# Patient Record
Sex: FEMALE | Race: WHITE | NOT HISPANIC OR LATINO | Employment: UNEMPLOYED | URBAN - NONMETROPOLITAN AREA
[De-identification: names, ages, dates, MRNs, and addresses within clinical notes are randomized per-mention and may not be internally consistent; named-entity substitution may affect disease eponyms.]

---

## 2017-07-10 ENCOUNTER — HOSPITAL ENCOUNTER (EMERGENCY)
Facility: HOSPITAL | Age: 57
Discharge: HOME | End: 2017-07-10
Attending: EMERGENCY MEDICINE | Admitting: EMERGENCY MEDICINE

## 2017-07-10 VITALS
RESPIRATION RATE: 18 BRPM | HEIGHT: 64 IN | SYSTOLIC BLOOD PRESSURE: 155 MMHG | OXYGEN SATURATION: 98 % | TEMPERATURE: 98.7 F | DIASTOLIC BLOOD PRESSURE: 78 MMHG | WEIGHT: 190 LBS | HEART RATE: 69 BPM | BODY MASS INDEX: 32.44 KG/M2

## 2017-07-10 DIAGNOSIS — N39.0 ACUTE UTI: Primary | ICD-10-CM

## 2017-07-10 DIAGNOSIS — E11.65 TYPE 2 DIABETES MELLITUS WITH HYPERGLYCEMIA, WITHOUT LONG-TERM CURRENT USE OF INSULIN (HCC): ICD-10-CM

## 2017-07-10 LAB
ALBUMIN SERPL-MCNC: 4 G/DL (ref 3.5–5)
ALBUMIN/GLOB SERPL: 1.5 G/DL (ref 1.5–2.5)
ALP SERPL-CCNC: 47 U/L (ref 35–104)
ALT SERPL W P-5'-P-CCNC: 18 U/L (ref 10–36)
ANION GAP SERPL CALCULATED.3IONS-SCNC: 10.4 MMOL/L (ref 3.6–11.2)
AST SERPL-CCNC: 15 U/L (ref 10–30)
BACTERIA UR QL AUTO: ABNORMAL /HPF
BASOPHILS # BLD AUTO: 0.11 10*3/MM3 (ref 0–0.3)
BASOPHILS NFR BLD AUTO: 1.4 % (ref 0–2)
BILIRUB SERPL-MCNC: 0.5 MG/DL (ref 0.2–1.8)
BILIRUB UR QL STRIP: NEGATIVE
BUN BLD-MCNC: 17 MG/DL (ref 7–21)
BUN/CREAT SERPL: 20.2 (ref 7–25)
CALCIUM SPEC-SCNC: 9.6 MG/DL (ref 7.7–10)
CHLORIDE SERPL-SCNC: 98 MMOL/L (ref 99–112)
CLARITY UR: CLEAR
CO2 SERPL-SCNC: 27.6 MMOL/L (ref 24.3–31.9)
COLOR UR: YELLOW
CREAT BLD-MCNC: 0.84 MG/DL (ref 0.43–1.29)
DEPRECATED RDW RBC AUTO: 41.5 FL (ref 37–54)
EOSINOPHIL # BLD AUTO: 0.18 10*3/MM3 (ref 0–0.7)
EOSINOPHIL NFR BLD AUTO: 2.3 % (ref 0–5)
ERYTHROCYTE [DISTWIDTH] IN BLOOD BY AUTOMATED COUNT: 13.4 % (ref 11.5–14.5)
GFR SERPL CREATININE-BSD FRML MDRD: 70 ML/MIN/1.73
GLOBULIN UR ELPH-MCNC: 2.6 GM/DL
GLUCOSE BLD-MCNC: 266 MG/DL (ref 70–110)
GLUCOSE BLDC GLUCOMTR-MCNC: 275 MG/DL (ref 70–130)
GLUCOSE UR STRIP-MCNC: ABNORMAL MG/DL
HCT VFR BLD AUTO: 39.1 % (ref 37–47)
HGB BLD-MCNC: 13 G/DL (ref 12–16)
HGB UR QL STRIP.AUTO: NEGATIVE
HYALINE CASTS UR QL AUTO: ABNORMAL /LPF
IMM GRANULOCYTES # BLD: 0.03 10*3/MM3 (ref 0–0.03)
IMM GRANULOCYTES NFR BLD: 0.4 % (ref 0–0.5)
KETONES UR QL STRIP: NEGATIVE
LEUKOCYTE ESTERASE UR QL STRIP.AUTO: ABNORMAL
LYMPHOCYTES # BLD AUTO: 3.12 10*3/MM3 (ref 1–3)
LYMPHOCYTES NFR BLD AUTO: 40.4 % (ref 21–51)
MCH RBC QN AUTO: 28.9 PG (ref 27–33)
MCHC RBC AUTO-ENTMCNC: 33.2 G/DL (ref 33–37)
MCV RBC AUTO: 86.9 FL (ref 80–94)
MONOCYTES # BLD AUTO: 0.62 10*3/MM3 (ref 0.1–0.9)
MONOCYTES NFR BLD AUTO: 8 % (ref 0–10)
NEUTROPHILS # BLD AUTO: 3.67 10*3/MM3 (ref 1.4–6.5)
NEUTROPHILS NFR BLD AUTO: 47.5 % (ref 30–70)
NITRITE UR QL STRIP: POSITIVE
OSMOLALITY SERPL CALC.SUM OF ELEC: 282.8 MOSM/KG (ref 273–305)
PH UR STRIP.AUTO: <=5 [PH] (ref 5–8)
PLATELET # BLD AUTO: 176 10*3/MM3 (ref 130–400)
PMV BLD AUTO: 12.7 FL (ref 6–10)
POTASSIUM BLD-SCNC: 3.2 MMOL/L (ref 3.5–5.3)
PROT SERPL-MCNC: 6.6 G/DL (ref 6–8)
PROT UR QL STRIP: ABNORMAL
RBC # BLD AUTO: 4.5 10*6/MM3 (ref 4.2–5.4)
RBC # UR: ABNORMAL /HPF
REF LAB TEST METHOD: ABNORMAL
SODIUM BLD-SCNC: 136 MMOL/L (ref 135–153)
SP GR UR STRIP: 1.01 (ref 1–1.03)
SQUAMOUS #/AREA URNS HPF: ABNORMAL /HPF
UROBILINOGEN UR QL STRIP: ABNORMAL
WBC NRBC COR # BLD: 7.73 10*3/MM3 (ref 4.5–12.5)
WBC UR QL AUTO: ABNORMAL /HPF

## 2017-07-10 PROCEDURE — 87077 CULTURE AEROBIC IDENTIFY: CPT | Performed by: PHYSICIAN ASSISTANT

## 2017-07-10 PROCEDURE — 87186 SC STD MICRODIL/AGAR DIL: CPT | Performed by: PHYSICIAN ASSISTANT

## 2017-07-10 PROCEDURE — 82962 GLUCOSE BLOOD TEST: CPT

## 2017-07-10 PROCEDURE — 99283 EMERGENCY DEPT VISIT LOW MDM: CPT

## 2017-07-10 PROCEDURE — 81001 URINALYSIS AUTO W/SCOPE: CPT | Performed by: PHYSICIAN ASSISTANT

## 2017-07-10 PROCEDURE — 36415 COLL VENOUS BLD VENIPUNCTURE: CPT

## 2017-07-10 PROCEDURE — 87086 URINE CULTURE/COLONY COUNT: CPT | Performed by: PHYSICIAN ASSISTANT

## 2017-07-10 PROCEDURE — 80053 COMPREHEN METABOLIC PANEL: CPT | Performed by: PHYSICIAN ASSISTANT

## 2017-07-10 PROCEDURE — 85025 COMPLETE CBC W/AUTO DIFF WBC: CPT | Performed by: PHYSICIAN ASSISTANT

## 2017-07-10 RX ORDER — LISINOPRIL 40 MG/1
40 TABLET ORAL DAILY
COMMUNITY

## 2017-07-10 RX ORDER — ATORVASTATIN CALCIUM 80 MG/1
80 TABLET, FILM COATED ORAL DAILY
COMMUNITY

## 2017-07-10 RX ORDER — DICLOFENAC SODIUM AND MISOPROSTOL 50; 200 MG/1; UG/1
1 TABLET, DELAYED RELEASE ORAL 2 TIMES DAILY
COMMUNITY
End: 2022-07-19 | Stop reason: SDUPTHER

## 2017-07-10 RX ORDER — FUROSEMIDE 20 MG/1
20 TABLET ORAL 2 TIMES DAILY
COMMUNITY

## 2017-07-10 RX ORDER — NITROFURANTOIN 25; 75 MG/1; MG/1
100 CAPSULE ORAL 2 TIMES DAILY
Qty: 14 CAPSULE | Refills: 0 | OUTPATIENT
Start: 2017-07-10 | End: 2019-05-04

## 2017-07-10 RX ORDER — HYDRALAZINE HYDROCHLORIDE 10 MG/1
10 TABLET, FILM COATED ORAL 3 TIMES DAILY
COMMUNITY

## 2017-07-10 RX ORDER — NITROFURANTOIN 25; 75 MG/1; MG/1
100 CAPSULE ORAL EVERY 12 HOURS SCHEDULED
Status: DISCONTINUED | OUTPATIENT
Start: 2017-07-10 | End: 2017-07-10 | Stop reason: HOSPADM

## 2017-07-10 RX ORDER — METFORMIN HYDROCHLORIDE 500 MG/1
500 TABLET, EXTENDED RELEASE ORAL
COMMUNITY

## 2017-07-10 RX ORDER — ARIPIPRAZOLE 10 MG/1
10 TABLET ORAL DAILY
COMMUNITY

## 2017-07-10 RX ORDER — OXYBUTYNIN CHLORIDE 5 MG/1
5 TABLET ORAL 3 TIMES DAILY
COMMUNITY

## 2017-07-10 RX ORDER — CETIRIZINE HYDROCHLORIDE 10 MG/1
10 TABLET ORAL DAILY
COMMUNITY

## 2017-07-10 RX ORDER — DULOXETIN HYDROCHLORIDE 60 MG/1
60 CAPSULE, DELAYED RELEASE ORAL DAILY
COMMUNITY

## 2017-07-10 RX ORDER — ASPIRIN 81 MG/1
81 TABLET, CHEWABLE ORAL DAILY
COMMUNITY

## 2017-07-10 RX ORDER — ATENOLOL AND CHLORTHALIDONE TABLET 100; 25 MG/1; MG/1
TABLET ORAL DAILY
COMMUNITY

## 2017-07-10 RX ORDER — AMLODIPINE BESYLATE 10 MG/1
10 TABLET ORAL DAILY
COMMUNITY

## 2017-07-10 RX ORDER — GABAPENTIN 100 MG/1
100 CAPSULE ORAL 3 TIMES DAILY
COMMUNITY

## 2017-07-10 RX ADMIN — NITROFURANTOIN MONOHYDRATE/MACROCRYSTALLINE 100 MG: 25; 75 CAPSULE ORAL at 20:28

## 2017-07-11 NOTE — ED PROVIDER NOTES
Subjective   HPI Comments: 57 yo WF, presented to ER w/ c/o elevated blood sugar.  Pt admitted that blood sugar has increased over the last couple of days.  Highest has been 500.  Pt admitted to no changes with her diet.  She is currently on 100mg of januvia, and 500 mg of metformin (bid).  Pt admitted to feeling confused, dizzy.        Review of Systems   Constitutional: Negative.  Negative for fever.   HENT: Negative.    Respiratory: Negative.    Cardiovascular: Negative.  Negative for chest pain.   Gastrointestinal: Negative.  Negative for abdominal pain.   Endocrine: Negative.    Genitourinary: Negative.  Negative for dysuria.   Skin: Negative.    Neurological: Negative.    Psychiatric/Behavioral: Negative.    All other systems reviewed and are negative.      Past Medical History:   Diagnosis Date   • Coronary artery disease    • Depression    • Diabetes mellitus    • GERD (gastroesophageal reflux disease)    • Hyperlipidemia    • Hypertension        Allergies   Allergen Reactions   • Latex        History reviewed. No pertinent surgical history.    History reviewed. No pertinent family history.    Social History     Social History   • Marital status:      Spouse name: N/A   • Number of children: N/A   • Years of education: N/A     Social History Main Topics   • Smoking status: Former Smoker   • Smokeless tobacco: None   • Alcohol use No   • Drug use: No   • Sexual activity: Defer     Other Topics Concern   • None     Social History Narrative   • None           Objective   Physical Exam   Constitutional: She is oriented to person, place, and time. She appears well-developed and well-nourished. No distress.   HENT:   Head: Normocephalic and atraumatic.   Nose: Nose normal.   Eyes: Conjunctivae and EOM are normal. Pupils are equal, round, and reactive to light.   Neck: Normal range of motion. Neck supple. No JVD present. No tracheal deviation present.   Cardiovascular: Normal rate, regular rhythm and normal  heart sounds.    No murmur heard.  Pulmonary/Chest: Effort normal and breath sounds normal. No respiratory distress. She has no wheezes.   Abdominal: Soft. Bowel sounds are normal. There is no tenderness.   Musculoskeletal: Normal range of motion. She exhibits no edema or deformity.   Neurological: She is alert and oriented to person, place, and time. No cranial nerve deficit.   Skin: Skin is warm and dry. No rash noted. She is not diaphoretic. No erythema. No pallor.   Psychiatric: She has a normal mood and affect. Her behavior is normal. Thought content normal.   Nursing note and vitals reviewed.      Procedures         ED Course  ED Course                  MDM  Number of Diagnoses or Management Options  Acute UTI: new and requires workup  Type 2 diabetes mellitus with hyperglycemia, without long-term current use of insulin: new and requires workup     Amount and/or Complexity of Data Reviewed  Clinical lab tests: ordered and reviewed    Risk of Complications, Morbidity, and/or Mortality  Presenting problems: low  Diagnostic procedures: low  Management options: low    Patient Progress  Patient progress: stable      Final diagnoses:   Acute UTI   Type 2 diabetes mellitus with hyperglycemia, without long-term current use of insulin            RENATE Dickerson  07/10/17 6318

## 2017-07-11 NOTE — ED NOTES
Patient reports elevated glucose readings today. Has been feeling very poorly for a couple of days. Complains of dizziness and fatigue. States she is staggering when she walks.  at bedside.     Jesika Leija RN  07/10/17 2031

## 2017-07-13 LAB — BACTERIA SPEC AEROBE CULT: ABNORMAL

## 2017-07-16 ENCOUNTER — HOSPITAL ENCOUNTER (EMERGENCY)
Facility: HOSPITAL | Age: 57
Discharge: HOME | End: 2017-07-16
Attending: EMERGENCY MEDICINE | Admitting: EMERGENCY MEDICINE

## 2017-07-16 VITALS
TEMPERATURE: 98.1 F | WEIGHT: 195 LBS | RESPIRATION RATE: 18 BRPM | HEIGHT: 64 IN | OXYGEN SATURATION: 100 % | SYSTOLIC BLOOD PRESSURE: 118 MMHG | HEART RATE: 70 BPM | BODY MASS INDEX: 33.29 KG/M2 | DIASTOLIC BLOOD PRESSURE: 72 MMHG

## 2017-07-16 DIAGNOSIS — R19.7 DIARRHEA, UNSPECIFIED TYPE: Primary | ICD-10-CM

## 2017-07-16 LAB
027 TOXIN: (no result)
ALBUMIN SERPL-MCNC: 4.2 G/DL (ref 3.5–5)
ALBUMIN/GLOB SERPL: 1.4 G/DL (ref 1.5–2.5)
ALP SERPL-CCNC: 51 U/L (ref 35–104)
ALT SERPL W P-5'-P-CCNC: 21 U/L (ref 10–36)
AMYLASE SERPL-CCNC: 20 U/L (ref 28–100)
ANION GAP SERPL CALCULATED.3IONS-SCNC: 9.2 MMOL/L (ref 3.6–11.2)
AST SERPL-CCNC: 24 U/L (ref 10–30)
BASOPHILS # BLD AUTO: 0.05 10*3/MM3 (ref 0–0.3)
BASOPHILS NFR BLD AUTO: 0.5 % (ref 0–2)
BILIRUB SERPL-MCNC: 0.5 MG/DL (ref 0.2–1.8)
BUN BLD-MCNC: 28 MG/DL (ref 7–21)
BUN/CREAT SERPL: 35 (ref 7–25)
C DIFF TOX GENS STL QL NAA+PROBE: NEGATIVE
CALCIUM SPEC-SCNC: 9.2 MG/DL (ref 7.7–10)
CHLORIDE SERPL-SCNC: 101 MMOL/L (ref 99–112)
CO2 SERPL-SCNC: 25.8 MMOL/L (ref 24.3–31.9)
CREAT BLD-MCNC: 0.8 MG/DL (ref 0.43–1.29)
DEPRECATED RDW RBC AUTO: 42.9 FL (ref 37–54)
EOSINOPHIL # BLD AUTO: 0.24 10*3/MM3 (ref 0–0.7)
EOSINOPHIL NFR BLD AUTO: 2.5 % (ref 0–5)
ERYTHROCYTE [DISTWIDTH] IN BLOOD BY AUTOMATED COUNT: 13.5 % (ref 11.5–14.5)
GFR SERPL CREATININE-BSD FRML MDRD: 74 ML/MIN/1.73
GLOBULIN UR ELPH-MCNC: 3.1 GM/DL
GLUCOSE BLD-MCNC: 155 MG/DL (ref 70–110)
HCT VFR BLD AUTO: 41 % (ref 37–47)
HGB BLD-MCNC: 13.2 G/DL (ref 12–16)
HOLD SPECIMEN: NORMAL
HOLD SPECIMEN: NORMAL
IMM GRANULOCYTES # BLD: 0.01 10*3/MM3 (ref 0–0.03)
IMM GRANULOCYTES NFR BLD: 0.1 % (ref 0–0.5)
LIPASE SERPL-CCNC: 26 U/L (ref 13–60)
LYMPHOCYTES # BLD AUTO: 3.16 10*3/MM3 (ref 1–3)
LYMPHOCYTES NFR BLD AUTO: 32.3 % (ref 21–51)
MCH RBC QN AUTO: 28.4 PG (ref 27–33)
MCHC RBC AUTO-ENTMCNC: 32.2 G/DL (ref 33–37)
MCV RBC AUTO: 88.4 FL (ref 80–94)
MONOCYTES # BLD AUTO: 0.83 10*3/MM3 (ref 0.1–0.9)
MONOCYTES NFR BLD AUTO: 8.5 % (ref 0–10)
NEUTROPHILS # BLD AUTO: 5.5 10*3/MM3 (ref 1.4–6.5)
NEUTROPHILS NFR BLD AUTO: 56.1 % (ref 30–70)
OSMOLALITY SERPL CALC.SUM OF ELEC: 280.6 MOSM/KG (ref 273–305)
PLATELET # BLD AUTO: 175 10*3/MM3 (ref 130–400)
PMV BLD AUTO: 13.4 FL (ref 6–10)
POTASSIUM BLD-SCNC: 3.4 MMOL/L (ref 3.5–5.3)
PROT SERPL-MCNC: 7.3 G/DL (ref 6–8)
RBC # BLD AUTO: 4.64 10*6/MM3 (ref 4.2–5.4)
RV AG STL QL IA: NEGATIVE
SODIUM BLD-SCNC: 136 MMOL/L (ref 135–153)
WBC NRBC COR # BLD: 9.79 10*3/MM3 (ref 4.5–12.5)
WHOLE BLOOD HOLD SPECIMEN: NORMAL
WHOLE BLOOD HOLD SPECIMEN: NORMAL

## 2017-07-16 PROCEDURE — 80053 COMPREHEN METABOLIC PANEL: CPT | Performed by: PHYSICIAN ASSISTANT

## 2017-07-16 PROCEDURE — 82150 ASSAY OF AMYLASE: CPT | Performed by: PHYSICIAN ASSISTANT

## 2017-07-16 PROCEDURE — 87798 DETECT AGENT NOS DNA AMP: CPT | Performed by: PHYSICIAN ASSISTANT

## 2017-07-16 PROCEDURE — 96361 HYDRATE IV INFUSION ADD-ON: CPT

## 2017-07-16 PROCEDURE — 25010000002 DICYCLOMINE PER 20 MG: Performed by: PHYSICIAN ASSISTANT

## 2017-07-16 PROCEDURE — 87425 ROTAVIRUS AG IA: CPT | Performed by: PHYSICIAN ASSISTANT

## 2017-07-16 PROCEDURE — 83690 ASSAY OF LIPASE: CPT | Performed by: PHYSICIAN ASSISTANT

## 2017-07-16 PROCEDURE — 99284 EMERGENCY DEPT VISIT MOD MDM: CPT

## 2017-07-16 PROCEDURE — 87493 C DIFF AMPLIFIED PROBE: CPT | Performed by: PHYSICIAN ASSISTANT

## 2017-07-16 PROCEDURE — 96372 THER/PROPH/DIAG INJ SC/IM: CPT

## 2017-07-16 PROCEDURE — 85025 COMPLETE CBC W/AUTO DIFF WBC: CPT | Performed by: PHYSICIAN ASSISTANT

## 2017-07-16 PROCEDURE — 96365 THER/PROPH/DIAG IV INF INIT: CPT

## 2017-07-16 RX ORDER — DICYCLOMINE HCL 20 MG
20 TABLET ORAL EVERY 8 HOURS PRN
Qty: 30 TABLET | Refills: 0 | Status: SHIPPED | OUTPATIENT
Start: 2017-07-16

## 2017-07-16 RX ORDER — POTASSIUM CHLORIDE 750 MG/1
10 TABLET, FILM COATED, EXTENDED RELEASE ORAL DAILY
Qty: 30 TABLET | Refills: 0 | Status: SHIPPED | OUTPATIENT
Start: 2017-07-16

## 2017-07-16 RX ORDER — DICYCLOMINE HYDROCHLORIDE 10 MG/1
20 CAPSULE ORAL ONCE
Status: COMPLETED | OUTPATIENT
Start: 2017-07-16 | End: 2017-07-16

## 2017-07-16 RX ORDER — SODIUM CHLORIDE 0.9 % (FLUSH) 0.9 %
10 SYRINGE (ML) INJECTION AS NEEDED
Status: DISCONTINUED | OUTPATIENT
Start: 2017-07-16 | End: 2017-07-17 | Stop reason: HOSPADM

## 2017-07-16 RX ORDER — DICYCLOMINE HYDROCHLORIDE 10 MG/ML
20 INJECTION INTRAMUSCULAR ONCE
Status: COMPLETED | OUTPATIENT
Start: 2017-07-16 | End: 2017-07-16

## 2017-07-16 RX ADMIN — DICYCLOMINE HYDROCHLORIDE 20 MG: 10 CAPSULE ORAL at 23:15

## 2017-07-16 RX ADMIN — SODIUM CHLORIDE 1000 ML: 9 INJECTION, SOLUTION INTRAVENOUS at 20:30

## 2017-07-16 RX ADMIN — METRONIDAZOLE 500 MG: 500 INJECTION, SOLUTION INTRAVENOUS at 21:44

## 2017-07-16 RX ADMIN — SODIUM CHLORIDE 1000 ML: 9 INJECTION, SOLUTION INTRAVENOUS at 21:21

## 2017-07-16 RX ADMIN — DICYCLOMINE HYDROCHLORIDE 20 MG: 20 INJECTION, SOLUTION INTRAMUSCULAR at 21:22

## 2017-07-17 NOTE — ED PROVIDER NOTES
Subjective   Patient is a 56 y.o. female presenting with diarrhea.   History provided by:  Patient  Diarrhea   The primary symptoms include diarrhea. Primary symptoms do not include fever, weight loss, fatigue, abdominal pain or dysuria. The illness began 3 to 5 days ago. The onset was sudden. The problem has been gradually worsening.   Associated medical issues do not include inflammatory bowel disease, GERD, gallstones, alcohol abuse, PUD, irritable bowel syndrome, hemorrhoids or diverticulitis.       Review of Systems   Constitutional: Negative.  Negative for fatigue, fever and weight loss.   HENT: Negative.    Respiratory: Negative.    Cardiovascular: Negative.  Negative for chest pain.   Gastrointestinal: Positive for diarrhea. Negative for abdominal pain.   Endocrine: Negative.    Genitourinary: Negative.  Negative for dysuria.   Skin: Negative.    Neurological: Negative.    Psychiatric/Behavioral: Negative.    All other systems reviewed and are negative.      Past Medical History:   Diagnosis Date   • Coronary artery disease    • Depression    • Diabetes mellitus    • GERD (gastroesophageal reflux disease)    • Hyperlipidemia    • Hypertension        Allergies   Allergen Reactions   • Latex        History reviewed. No pertinent surgical history.    History reviewed. No pertinent family history.    Social History     Social History   • Marital status:      Spouse name: N/A   • Number of children: N/A   • Years of education: N/A     Social History Main Topics   • Smoking status: Former Smoker   • Smokeless tobacco: None   • Alcohol use No   • Drug use: No   • Sexual activity: Defer     Other Topics Concern   • None     Social History Narrative           Objective   Physical Exam   Constitutional: She is oriented to person, place, and time. She appears well-developed and well-nourished. No distress.   HENT:   Head: Normocephalic and atraumatic.   Nose: Nose normal.   Eyes: Conjunctivae and EOM are normal.  Pupils are equal, round, and reactive to light.   Neck: Normal range of motion. Neck supple. No JVD present. No tracheal deviation present.   Cardiovascular: Normal rate, regular rhythm and normal heart sounds.    No murmur heard.  Pulmonary/Chest: Effort normal and breath sounds normal. No respiratory distress. She has no wheezes.   Abdominal: Soft. Bowel sounds are normal. There is no tenderness.   Hyperactive BS   Musculoskeletal: Normal range of motion. She exhibits no edema or deformity.   Neurological: She is alert and oriented to person, place, and time. No cranial nerve deficit.   Skin: Skin is warm and dry. No rash noted. She is not diaphoretic. No erythema. No pallor.   Psychiatric: She has a normal mood and affect. Her behavior is normal. Thought content normal.   Nursing note and vitals reviewed.      Procedures         ED Course  ED Course                  MDM  Number of Diagnoses or Management Options  Diarrhea, unspecified type: new and requires workup     Amount and/or Complexity of Data Reviewed  Clinical lab tests: ordered and reviewed    Risk of Complications, Morbidity, and/or Mortality  Presenting problems: moderate  Diagnostic procedures: moderate  Management options: low    Patient Progress  Patient progress: stable      Final diagnoses:   Diarrhea, unspecified type            RENATE Dickerson  07/16/17 0170

## 2017-07-17 NOTE — ED NOTES
Patient IV noted to have signs of infiltration at this time after pt returned from ED restroom. A hardened, enlarged area over IV site noted at this time; flagyl and normal saline paused at this time. Pharmacy called to question concerns dangers of infiltrating flagyl and spoke with Carroll who states we do not have a certain protocol for flagyl. Lead nurse made aware for further instruction.      Deion Hogue RN  07/16/17 6448

## 2017-07-17 NOTE — ED NOTES
Pressure dressing applied to IV infiltration site as well as warm compresses.      Deion Hogue RN  07/16/17 8033

## 2017-07-19 LAB
NOROVIRUS GI: NEGATIVE
NOROVIRUS GII: NEGATIVE

## 2019-03-14 ENCOUNTER — APPOINTMENT (OUTPATIENT)
Dept: MAMMOGRAPHY | Facility: HOSPITAL | Age: 59
End: 2019-03-14

## 2019-04-05 ENCOUNTER — APPOINTMENT (OUTPATIENT)
Dept: MAMMOGRAPHY | Facility: HOSPITAL | Age: 59
End: 2019-04-05

## 2019-04-17 ENCOUNTER — APPOINTMENT (OUTPATIENT)
Dept: MAMMOGRAPHY | Facility: HOSPITAL | Age: 59
End: 2019-04-17

## 2019-04-25 ENCOUNTER — TRANSCRIBE ORDERS (OUTPATIENT)
Dept: ADMINISTRATIVE | Facility: HOSPITAL | Age: 59
End: 2019-04-25

## 2019-04-26 ENCOUNTER — TRANSCRIBE ORDERS (OUTPATIENT)
Dept: ADMINISTRATIVE | Facility: HOSPITAL | Age: 59
End: 2019-04-26

## 2019-04-26 DIAGNOSIS — E04.1 RIGHT THYROID NODULE: Primary | ICD-10-CM

## 2019-05-03 ENCOUNTER — HOSPITAL ENCOUNTER (OUTPATIENT)
Dept: ULTRASOUND IMAGING | Facility: HOSPITAL | Age: 59
Discharge: HOME | End: 2019-05-03
Admitting: NURSE PRACTITIONER

## 2019-05-03 DIAGNOSIS — E04.1 RIGHT THYROID NODULE: ICD-10-CM

## 2019-05-03 PROCEDURE — 76536 US EXAM OF HEAD AND NECK: CPT

## 2019-05-03 PROCEDURE — 76536 US EXAM OF HEAD AND NECK: CPT | Performed by: RADIOLOGY

## 2022-07-19 ENCOUNTER — APPOINTMENT (OUTPATIENT)
Dept: GENERAL RADIOLOGY | Facility: HOSPITAL | Age: 62
End: 2022-07-19

## 2022-07-19 ENCOUNTER — HOSPITAL ENCOUNTER (EMERGENCY)
Facility: HOSPITAL | Age: 62
Discharge: HOME | End: 2022-07-19
Attending: STUDENT IN AN ORGANIZED HEALTH CARE EDUCATION/TRAINING PROGRAM | Admitting: STUDENT IN AN ORGANIZED HEALTH CARE EDUCATION/TRAINING PROGRAM

## 2022-07-19 VITALS
BODY MASS INDEX: 24.39 KG/M2 | OXYGEN SATURATION: 98 % | HEART RATE: 98 BPM | DIASTOLIC BLOOD PRESSURE: 85 MMHG | HEIGHT: 65 IN | RESPIRATION RATE: 16 BRPM | WEIGHT: 146.4 LBS | TEMPERATURE: 98.2 F | SYSTOLIC BLOOD PRESSURE: 176 MMHG

## 2022-07-19 DIAGNOSIS — M19.041 ARTHRITIS OF HAND, RIGHT: Primary | ICD-10-CM

## 2022-07-19 PROCEDURE — 99283 EMERGENCY DEPT VISIT LOW MDM: CPT

## 2022-07-19 PROCEDURE — 73120 X-RAY EXAM OF HAND: CPT

## 2022-07-19 RX ORDER — CLONIDINE HYDROCHLORIDE 0.1 MG/1
0.1 TABLET ORAL ONCE
Status: COMPLETED | OUTPATIENT
Start: 2022-07-19 | End: 2022-07-19

## 2022-07-19 RX ORDER — DICLOFENAC SODIUM AND MISOPROSTOL 50; 200 MG/1; UG/1
1 TABLET, DELAYED RELEASE ORAL 2 TIMES DAILY
Qty: 60 TABLET | Refills: 3 | Status: SHIPPED | OUTPATIENT
Start: 2022-07-19

## 2022-07-19 RX ORDER — IBUPROFEN 400 MG/1
800 TABLET ORAL ONCE
Status: COMPLETED | OUTPATIENT
Start: 2022-07-19 | End: 2022-07-19

## 2022-07-19 RX ORDER — HYDROCODONE BITARTRATE AND ACETAMINOPHEN 5; 325 MG/1; MG/1
1 TABLET ORAL ONCE
Status: COMPLETED | OUTPATIENT
Start: 2022-07-19 | End: 2022-07-19

## 2022-07-19 RX ADMIN — HYDROCODONE BITARTRATE AND ACETAMINOPHEN 1 TABLET: 5; 325 TABLET ORAL at 22:17

## 2022-07-19 RX ADMIN — CLONIDINE HYDROCHLORIDE 0.1 MG: 0.1 TABLET ORAL at 22:17

## 2022-07-19 RX ADMIN — IBUPROFEN 800 MG: 400 TABLET, FILM COATED ORAL at 22:17

## 2022-07-20 NOTE — ED NOTES
MEDICAL SCREENING:    Reason for Visit: R thumb pain      Patient initially seen in triage.  The patient was advised further evaluation and diagnostic testing will be needed, some of the treatment and testing will be initiated in the lobby in order to begin the process.  The patient will be returned to the waiting area for the time being and possibly be re-assessed by a subsequent ED provider.  The patient will be brought back to the treatment area in as timely manner as possible.         Katelin Rios PA  07/19/22 6749

## 2022-07-20 NOTE — ED PROVIDER NOTES
Subjective     History provided by:  Patient  Hand Pain  Location:  Right hand / thumb  Quality:  Pain  Severity:  Moderate  Onset quality:  Gradual  Timing:  Constant  Progression:  Worsening  Chronicity:  New  Context:  No injury, worsening pain.   Worsened by:  Movement  Ineffective treatments:  None tried  Associated symptoms: no abdominal pain, no chest pain and no fever        Review of Systems   Constitutional: Negative.  Negative for fever.   HENT: Negative.    Respiratory: Negative.    Cardiovascular: Negative.  Negative for chest pain.   Gastrointestinal: Negative.  Negative for abdominal pain.   Endocrine: Negative.    Genitourinary: Negative.  Negative for dysuria.   Musculoskeletal: Positive for arthralgias.   Skin: Negative.    Neurological: Negative.    Psychiatric/Behavioral: Negative.    All other systems reviewed and are negative.      Past Medical History:   Diagnosis Date   • Coronary artery disease    • Depression    • Diabetes mellitus (CMS/HCC)    • GERD (gastroesophageal reflux disease)    • Hyperlipidemia    • Hypertension        Allergies   Allergen Reactions   • Latex        No past surgical history on file.    No family history on file.    Social History     Socioeconomic History   • Marital status:    Tobacco Use   • Smoking status: Former Smoker   Substance and Sexual Activity   • Alcohol use: No   • Drug use: No   • Sexual activity: Defer           Objective   Physical Exam  Vitals and nursing note reviewed.   Constitutional:       General: She is not in acute distress.     Appearance: She is well-developed. She is not diaphoretic.   HENT:      Head: Normocephalic and atraumatic.      Right Ear: External ear normal.      Left Ear: External ear normal.      Nose: Nose normal.   Eyes:      Conjunctiva/sclera: Conjunctivae normal.   Neck:      Vascular: No JVD.      Trachea: No tracheal deviation.   Cardiovascular:      Rate and Rhythm: Normal rate.      Heart sounds: No murmur  heard.  Pulmonary:      Effort: Pulmonary effort is normal. No respiratory distress.      Breath sounds: No wheezing.   Abdominal:      Palpations: Abdomen is soft.      Tenderness: There is no abdominal tenderness.   Musculoskeletal:         General: Tenderness present. No deformity.      Cervical back: Normal range of motion and neck supple.      Comments: Tenderness to palpation of the right thumb   Skin:     General: Skin is warm and dry.      Coloration: Skin is not pale.      Findings: No erythema or rash.   Neurological:      Mental Status: She is alert and oriented to person, place, and time.      Cranial Nerves: No cranial nerve deficit.   Psychiatric:         Behavior: Behavior normal.         Thought Content: Thought content normal.         Procedures           ED Course  ED Course as of 07/19/22 2215   Tue Jul 19, 2022 2202 XR Hand rad interpreted:  Findings of osteoarthritis right hand without acute fracture or dislocation. [RB]      ED Course User Index  [RB] Param Eastman II, PA                                           MDM  Number of Diagnoses or Management Options  Arthritis of hand, right: new and requires workup     Amount and/or Complexity of Data Reviewed  Tests in the radiology section of CPT®: ordered and reviewed    Risk of Complications, Morbidity, and/or Mortality  Presenting problems: low  Diagnostic procedures: low  Management options: low    Patient Progress  Patient progress: stable      Final diagnoses:   Arthritis of hand, right       ED Disposition  ED Disposition     ED Disposition   Discharge    Condition   Stable    Comment   --             [facility] - [facility]  See Provider List  [place] [address]  [phone]  Schedule an appointment as soon as possible for a visit            Medication List      Changed    diclofenac-miSOPROStol 50-0.2 MG EC tablet  Commonly known as: ARTHROTEC 50  Take 1 tablet by mouth 2 (Two) Times a Day.  What changed: when to take this            Where to Get Your Medications      These medications were sent to [facility] - [place], [place] - [address] - [phone]  - [phone] FX  [address] [facility] [place] [address]    Phone: [phone]   · diclofenac-miSOPROStol 50-0.2 MG EC tablet          Param Eastman II, PA  07/19/22 3719

## 2024-07-11 ENCOUNTER — HOSPITAL ENCOUNTER (OUTPATIENT)
Dept: GENERAL RADIOLOGY | Facility: HOSPITAL | Age: 64
Discharge: HOME | End: 2024-07-11
Payer: MEDICAID

## 2024-07-11 ENCOUNTER — TRANSCRIBE ORDERS (OUTPATIENT)
Dept: ADMINISTRATIVE | Facility: HOSPITAL | Age: 64
End: 2024-07-11
Payer: MEDICAID

## 2024-07-11 DIAGNOSIS — M54.6 PAIN IN THORACIC SPINE: ICD-10-CM

## 2024-07-11 DIAGNOSIS — M54.6 PAIN IN THORACIC SPINE: Primary | ICD-10-CM

## 2024-07-11 PROCEDURE — 72072 X-RAY EXAM THORAC SPINE 3VWS: CPT

## 2024-07-11 PROCEDURE — 71046 X-RAY EXAM CHEST 2 VIEWS: CPT

## 2024-07-11 PROCEDURE — 71046 X-RAY EXAM CHEST 2 VIEWS: CPT | Performed by: RADIOLOGY
